# Patient Record
Sex: MALE | Race: WHITE | Employment: OTHER | URBAN - NONMETROPOLITAN AREA
[De-identification: names, ages, dates, MRNs, and addresses within clinical notes are randomized per-mention and may not be internally consistent; named-entity substitution may affect disease eponyms.]

---

## 2022-11-25 ENCOUNTER — APPOINTMENT (OUTPATIENT)
Dept: GENERAL RADIOLOGY | Age: 52
End: 2022-11-25
Attending: INTERNAL MEDICINE
Payer: OTHER GOVERNMENT

## 2022-11-25 ENCOUNTER — HOSPITAL ENCOUNTER (EMERGENCY)
Age: 52
Discharge: HOME | End: 2022-11-25
Attending: INTERNAL MEDICINE
Payer: OTHER GOVERNMENT

## 2022-11-25 VITALS
TEMPERATURE: 98.1 F | DIASTOLIC BLOOD PRESSURE: 56 MMHG | OXYGEN SATURATION: 99 % | WEIGHT: 188 LBS | HEART RATE: 95 BPM | SYSTOLIC BLOOD PRESSURE: 129 MMHG | HEIGHT: 70 IN | RESPIRATION RATE: 15 BRPM | BODY MASS INDEX: 26.92 KG/M2

## 2022-11-25 DIAGNOSIS — S52.602A NONDISPLACED FRACTURE OF DISTAL END OF LEFT ULNA: ICD-10-CM

## 2022-11-25 DIAGNOSIS — S52.509A NONDISPLACED FRACTURE OF DISTAL END OF RADIUS: Primary | ICD-10-CM

## 2022-11-25 PROCEDURE — 99283 EMERGENCY DEPT VISIT LOW MDM: CPT

## 2022-11-25 PROCEDURE — 73110 X-RAY EXAM OF WRIST: CPT

## 2022-11-25 NOTE — ED PROVIDER NOTES
EMERGENCY DEPARTMENT HISTORY AND PHYSICAL EXAM      Date: 11/25/2022  Patient Name: Lali Lorenzana    History of Presenting Illness     Chief Complaint   Patient presents with    Wrist Pain     left       History Provided By: Patient    HPI: Lali Lorenzana, 46 y.o. male [employer] travelling through area on RV visiting mother in law and was up on a ladder yesterday afternoon; slipped; fell striking his  left wrist on RV. Iced it down last night but noted swelling and pain of the left wrist area and came to the ER. No other injury. There are no other complaints, changes, or physical findings at this time. Past History     Past Medical History:  History reviewed. No pertinent past medical history. Past Surgical History:  History reviewed. No pertinent surgical history. Family History:  History reviewed. No pertinent family history. Social History:  Social History     Tobacco Use    Smoking status: Never    Smokeless tobacco: Never   Substance Use Topics    Drug use: Never       Allergies:  No Known Allergies    PCP: None    No current facility-administered medications on file prior to encounter. No current outpatient medications on file prior to encounter. Review of Systems   Review of Systems   Constitutional:  Negative for chills and fever. Eyes:  Negative for photophobia. Respiratory:  Negative for shortness of breath. Cardiovascular:  Negative for chest pain. Gastrointestinal:  Negative for abdominal pain. Musculoskeletal:  Positive for arthralgias. Skin:  Negative for wound. Hematological:  Does not bruise/bleed easily. Physical Exam   Physical Exam  Vitals and nursing note reviewed. Constitutional:       General: He is not in acute distress. Appearance: Normal appearance. He is well-developed. He is not ill-appearing or diaphoretic. HENT:      Head: Normocephalic. Eyes:      Extraocular Movements: Extraocular movements intact.       Conjunctiva/sclera: Conjunctivae normal.      Pupils: Pupils are equal, round, and reactive to light. Pulmonary:      Effort: Pulmonary effort is normal.   Musculoskeletal:         General: Tenderness present. Normal range of motion. Cervical back: Neck supple. Comments: No left elbow pain. Normal range of motion of the left elbow. No forearm tenderness or compartment syndrome. Normal radial pulse. Tenderness palpation of the distal radius and distal ulna. No compartment syndrome. No navicular snuffbox pain. No hand or finger pain. Mild swelling. No ecchymosis or deformity. No crepitation. Skin:     General: Skin is warm and dry. Neurological:      Mental Status: He is alert and oriented to person, place, and time. Lab and Diagnostic Study Results   Labs -   No results found for this or any previous visit (from the past 12 hour(s)). Radiologic Studies -   @lastxrresult@  CT Results  (Last 48 hours)      None          CXR Results  (Last 48 hours)      None            Medical Decision Making and ED Course   Differential Diagnosis & Medical Decision Making Provider Note:   DDX: To includebut not limited to the following: fracture, contusion, dislocation, tendon / ligament injury, sprain, strain     - I am the first provider for this patient. I reviewed the vital signs, available nursing notes, past medical history, past surgical history, family history and social history. The patients presenting problems have been discussed, and they are in agreement with the care plan formulated and outlined with them. I have encouraged them to ask questions as they arise throughout their visit. Vital Signs-Reviewed the patient's vital signs.   Patient Vitals for the past 12 hrs:   Temp Pulse Resp BP SpO2   11/25/22 1022 98.1 °F (36.7 °C) 95 15 (!) 129/56 99 %        XR WRIST LT AP/LAT/OBL MIN 3V (Final result)  Result time 11/25/22 11:01:25  Final result by Omid Jacques MD (11/25/22 11:01:25) Impression:      Nondisplaced fracture distal radius. Fracture of the ulnar styloid. ED Course:    Non displaced; ok to  put in velcro splint. 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 Tylenol for pain  Procedures       Disposition     DISCHARGE  Diagnosis/Clinical Impression     Clinical Impression:   1. Nondisplaced fracture of distal end of radius    2. Nondisplaced fracture of distal end of left ulna        Attestations: Ernesto Miranda MD, am the primary clinician of record. Please note that this dictation was completed with DocOnYou, the computer voice recognition software. Quite often unanticipated grammatical, syntax, homophones, and other interpretive errors are inadvertently transcribed by the computer software. Please disregard these errors. Please excuse any errors that have escaped final proofreading. Thank you.

## 2022-12-21 ENCOUNTER — HOSPITAL ENCOUNTER (OUTPATIENT)
Dept: GENERAL RADIOLOGY | Age: 52
Discharge: HOME | End: 2022-12-21
Attending: ORTHOPAEDIC SURGERY
Payer: OTHER GOVERNMENT

## 2022-12-21 ENCOUNTER — OFFICE VISIT (OUTPATIENT)
Dept: ORTHOPEDIC SURGERY | Age: 52
End: 2022-12-21
Payer: OTHER GOVERNMENT

## 2022-12-21 VITALS — HEIGHT: 70 IN | BODY MASS INDEX: 26.48 KG/M2 | WEIGHT: 185 LBS

## 2022-12-21 DIAGNOSIS — M25.532 LEFT WRIST PAIN: Primary | ICD-10-CM

## 2022-12-21 DIAGNOSIS — S52.552A OTHER CLOSED EXTRA-ARTICULAR FRACTURE OF DISTAL END OF LEFT RADIUS, INITIAL ENCOUNTER: Primary | ICD-10-CM

## 2022-12-21 DIAGNOSIS — M25.532 LEFT WRIST PAIN: ICD-10-CM

## 2022-12-21 DIAGNOSIS — S52.615A CLOSED NONDISPLACED FRACTURE OF STYLOID PROCESS OF LEFT ULNA, INITIAL ENCOUNTER: ICD-10-CM

## 2022-12-21 PROCEDURE — 99203 OFFICE O/P NEW LOW 30 MIN: CPT | Performed by: NURSE PRACTITIONER

## 2022-12-21 PROCEDURE — 25600 CLTX DST RDL FX/EPHYS SEP WO: CPT | Performed by: NURSE PRACTITIONER

## 2022-12-21 PROCEDURE — 73110 X-RAY EXAM OF WRIST: CPT

## 2022-12-21 NOTE — PROGRESS NOTES
Name: Lali Lorenzana    : 1970    Weight Loss Metrics 2022   Today's Wt 185 lb 188 lb   BMI 26.54 kg/m2 26.98 kg/m2       Body mass index is 26.54 kg/m². Service Dept: [address] [place] [address] and Sports Medicine    Patient's Pharmacies:  No Pharmacies Listed     Chief Complaint   Patient presents with    Wrist Pain       HPI:  Patient presents today for evaluation of a left wrist injury that occurred a month ago after he fell from a ladder and onto turf. He initially presented to an urgent care facility where x-rays were made which showed a nondisplaced distal radius fracture, nonarticular and also a nondisplaced ulnar styloid fracture. Patient has been wearing a Velcro wrist brace since that injury. Visit Vitals  Ht 5' 10\" (1.778 m)   Wt 185 lb (83.9 kg)   BMI 26.54 kg/m²      No Known Allergies      There is no problem list on file for this patient. Family History   Problem Relation Age of Onset    No Known Problems Mother     No Known Problems Father       Social History     Socioeconomic History    Marital status:    Tobacco Use    Smoking status: Never     Passive exposure: Never    Smokeless tobacco: Never   Vaping Use    Vaping Use: Never used   Substance and Sexual Activity    Alcohol use: Yes     Comment: occ    Drug use: Never    Sexual activity: Not Currently      History reviewed. No pertinent surgical history. History reviewed. No pertinent past medical history. I have reviewed and agree with [address] and ROS and intake form in chart and the record furthermore I have reviewed prior medical record(s) regarding this patients care during this appointment. ROM:  Patient is pleasant appearing individual, appropriately dressed, well hydrated, well nourished, who is alert, appropriately oriented for age, and in no acute distress with a normal gait andnotmal affect who does not appear to be in any significant pain.     Physical Exam:  On physical exam today there is still some mild palpable tenderness over the left distal radius and ulnar styloid. There is no swelling over the wrist or hand and he demonstrates full range of motion on flexion extension and in all the digits. He is neurovascularly intact    New x-rays taken today of the left wrist show good interval healing of his extra-articular distal radius fracture and ulnar styloid fracture. Encounter Diagnoses     ICD-10-CM ICD-9-CM   1. Other closed extra-articular fracture of distal end of left radius, initial encounter  S52.552A 813.42   2. Closed nondisplaced fracture of styloid process of left ulna, initial encounter  S52.615A 813.43       As part of continued conservative pain management options the patient was advised to utilize Tylenol or OTC NSAIDS as long as it is not medically contraindicated. Return to Office:   Today I reviewed the clinical and radiographic findings with the patient and recommended that he continue using his wrist brace for another couple of weeks. At that point he can begin to wean out of the brace and advance to full active range of motion as tolerated.   He will remain nonweightbearing until he follows up in 4 more weeks for final marissa-ray of the left wrist.         SHEBA Díaz

## 2023-01-10 ENCOUNTER — OFFICE VISIT (OUTPATIENT)
Dept: ORTHOPEDIC SURGERY | Age: 53
End: 2023-01-10
Payer: OTHER GOVERNMENT

## 2023-01-10 DIAGNOSIS — S52.615D CLOSED NONDISPLACED FRACTURE OF STYLOID PROCESS OF LEFT ULNA WITH ROUTINE HEALING, SUBSEQUENT ENCOUNTER: ICD-10-CM

## 2023-01-10 DIAGNOSIS — S62.102D CLOSED FRACTURE OF LEFT WRIST WITH ROUTINE HEALING, SUBSEQUENT ENCOUNTER: Primary | ICD-10-CM

## 2023-01-10 PROCEDURE — 99024 POSTOP FOLLOW-UP VISIT: CPT | Performed by: NURSE PRACTITIONER

## 2023-01-10 NOTE — PROGRESS NOTES
Name: Kanu Oseguera    : 1970    Weight Loss Metrics 2022   Today's Wt 185 lb 188 lb   BMI 26.54 kg/m2 26.98 kg/m2       There is no height or weight on file to calculate BMI. Service Dept: [address] [place] [address] and Sports Medicine    Patient's Pharmacies:  No Pharmacies Listed     Chief Complaint   Patient presents with    Wrist Pain       HPI:  Patient follows up today for reevaluation of a left wrist fracture that occurred approximately 6 weeks after he fell from a ladder and onto turf. He was initially treated nonoperatively with immobilization in a Velcro wrist brace. He has been out of the brace since his last visit almost 2 weeks ago. There were no vitals taken for this visit. No Known Allergies      There is no problem list on file for this patient. Family History   Problem Relation Age of Onset    No Known Problems Mother     No Known Problems Father       Social History     Socioeconomic History    Marital status:    Tobacco Use    Smoking status: Never     Passive exposure: Never    Smokeless tobacco: Never   Vaping Use    Vaping Use: Never used   Substance and Sexual Activity    Alcohol use: Yes     Comment: occ    Drug use: Never    Sexual activity: Not Currently      History reviewed. No pertinent surgical history. History reviewed. No pertinent past medical history. I have reviewed and agree with [address] [place] [address] [address] and ROS and intake form in chart and the record furthermore I have reviewed prior medical record(s) regarding this patients care during this appointment. Physical Exam:  On physical exam today there is no palpable tenderness over the distal radius. There is some slight discomfort on flexion of the wrist but he is able to flex and extend although limited by some stiffness and soreness. Repeat x-rays taken today of the left wrist show continued interval healing of the distal radius and ulnar styloid fractures.     Encounter Diagnoses     ICD-10-CM ICD-9-CM   1. Closed fracture of left wrist with routine healing, subsequent encounter  S62.102D V54.19       As part of continued conservative pain management options the patient was advised to utilize Tylenol or OTC NSAIDS as long as it is not medically contraindicated. Return to Office:   Today reviewed the clinical and radiographic findings with the patient. He will begin use of the hand and weightbearing as tolerated. No immobilization is required. As long as he is doing well no further routine follow-up or x-rays will be scheduled but he can see us back anytime as needed.          [address] [facility]